# Patient Record
Sex: FEMALE | Race: OTHER | NOT HISPANIC OR LATINO
[De-identification: names, ages, dates, MRNs, and addresses within clinical notes are randomized per-mention and may not be internally consistent; named-entity substitution may affect disease eponyms.]

---

## 2021-06-25 PROBLEM — Z00.00 ENCOUNTER FOR PREVENTIVE HEALTH EXAMINATION: Noted: 2021-06-25

## 2021-06-25 PROBLEM — Z00.00 ENCOUNTER FOR PREVENTIVE HEALTH EXAMINATION: Status: ACTIVE | Noted: 2021-06-25

## 2021-06-29 ENCOUNTER — ASOB RESULT (OUTPATIENT)
Age: 26
End: 2021-06-29

## 2021-06-29 ENCOUNTER — APPOINTMENT (OUTPATIENT)
Dept: MATERNAL FETAL MEDICINE | Facility: CLINIC | Age: 26
End: 2021-06-29
Payer: COMMERCIAL

## 2021-06-29 DIAGNOSIS — O24.912 UNSPECIFIED DIABETES MELLITUS IN PREGNANCY, SECOND TRIMESTER: ICD-10-CM

## 2021-06-29 PROCEDURE — G0109 DIAB MANAGE TRN IND/GROUP: CPT | Mod: 95

## 2021-06-29 RX ORDER — CALCIUM CARB/VITAMIN D3/VIT K1 500-100-40
31G X 5/16" TABLET,CHEWABLE ORAL
Qty: 1 | Refills: 1 | Status: ACTIVE | COMMUNITY
Start: 2021-06-29 | End: 1900-01-01

## 2021-06-29 RX ORDER — ELECTROLYTES/DEXTROSE
32G X 4 MM SOLUTION, ORAL ORAL
Qty: 1 | Refills: 1 | Status: ACTIVE | COMMUNITY
Start: 2021-06-29 | End: 1900-01-01

## 2021-06-29 RX ORDER — ISOPROPYL ALCOHOL 0.7 ML/ML
SWAB TOPICAL
Qty: 2 | Refills: 2 | Status: ACTIVE | COMMUNITY
Start: 2021-06-29 | End: 1900-01-01

## 2021-06-29 RX ORDER — BLOOD-GLUCOSE METER
KIT MISCELLANEOUS 4 TIMES DAILY
Qty: 150 | Refills: 2 | Status: ACTIVE | COMMUNITY
Start: 2021-06-29 | End: 1900-01-01

## 2021-06-29 RX ORDER — LANCETS 33 GAUGE
EACH MISCELLANEOUS
Qty: 2 | Refills: 2 | Status: ACTIVE | COMMUNITY
Start: 2021-06-29 | End: 1900-01-01

## 2021-06-29 RX ORDER — BLOOD-GLUCOSE METER
W/DEVICE KIT MISCELLANEOUS
Qty: 1 | Refills: 0 | Status: ACTIVE | COMMUNITY
Start: 2021-06-29 | End: 1900-01-01

## 2021-07-06 ENCOUNTER — APPOINTMENT (OUTPATIENT)
Dept: PEDIATRIC CARDIOLOGY | Facility: CLINIC | Age: 26
End: 2021-07-06
Payer: COMMERCIAL

## 2021-07-06 PROCEDURE — 76825 ECHO EXAM OF FETAL HEART: CPT

## 2021-07-06 PROCEDURE — 99202 OFFICE O/P NEW SF 15 MIN: CPT | Mod: 25

## 2021-07-06 PROCEDURE — 93325 DOPPLER ECHO COLOR FLOW MAPG: CPT | Mod: 59

## 2021-07-06 PROCEDURE — 99072 ADDL SUPL MATRL&STAF TM PHE: CPT

## 2021-07-06 PROCEDURE — 76820 UMBILICAL ARTERY ECHO: CPT

## 2021-07-06 PROCEDURE — 76827 ECHO EXAM OF FETAL HEART: CPT

## 2021-07-12 ENCOUNTER — ASOB RESULT (OUTPATIENT)
Age: 26
End: 2021-07-12

## 2021-07-12 ENCOUNTER — APPOINTMENT (OUTPATIENT)
Dept: MATERNAL FETAL MEDICINE | Facility: CLINIC | Age: 26
End: 2021-07-12
Payer: COMMERCIAL

## 2021-07-12 PROCEDURE — G0108 DIAB MANAGE TRN  PER INDIV: CPT | Mod: 95

## 2021-07-26 ENCOUNTER — NON-APPOINTMENT (OUTPATIENT)
Age: 26
End: 2021-07-26

## 2021-07-26 ENCOUNTER — APPOINTMENT (OUTPATIENT)
Dept: MATERNAL FETAL MEDICINE | Facility: CLINIC | Age: 26
End: 2021-07-26

## 2021-07-26 ENCOUNTER — APPOINTMENT (OUTPATIENT)
Dept: ANTEPARTUM | Facility: CLINIC | Age: 26
End: 2021-07-26

## 2021-07-30 ENCOUNTER — APPOINTMENT (OUTPATIENT)
Dept: MATERNAL FETAL MEDICINE | Facility: CLINIC | Age: 26
End: 2021-07-30

## 2021-07-30 ENCOUNTER — NON-APPOINTMENT (OUTPATIENT)
Age: 26
End: 2021-07-30

## 2021-08-06 ENCOUNTER — ASOB RESULT (OUTPATIENT)
Age: 26
End: 2021-08-06

## 2021-08-06 ENCOUNTER — APPOINTMENT (OUTPATIENT)
Dept: MATERNAL FETAL MEDICINE | Facility: CLINIC | Age: 26
End: 2021-08-06
Payer: COMMERCIAL

## 2021-08-06 PROCEDURE — G0108 DIAB MANAGE TRN  PER INDIV: CPT | Mod: 95

## 2021-08-06 RX ORDER — METFORMIN ER 500 MG 500 MG/1
500 TABLET ORAL
Qty: 1 | Refills: 3 | Status: ACTIVE | COMMUNITY
Start: 2021-08-06 | End: 1900-01-01

## 2021-08-13 ENCOUNTER — APPOINTMENT (OUTPATIENT)
Dept: ANTEPARTUM | Facility: CLINIC | Age: 26
End: 2021-08-13
Payer: COMMERCIAL

## 2021-08-13 ENCOUNTER — ASOB RESULT (OUTPATIENT)
Age: 26
End: 2021-08-13

## 2021-08-13 PROCEDURE — 76816 OB US FOLLOW-UP PER FETUS: CPT

## 2021-08-13 PROCEDURE — 76818 FETAL BIOPHYS PROFILE W/NST: CPT

## 2021-08-13 PROCEDURE — 99072 ADDL SUPL MATRL&STAF TM PHE: CPT

## 2021-08-20 ENCOUNTER — ASOB RESULT (OUTPATIENT)
Age: 26
End: 2021-08-20

## 2021-08-20 ENCOUNTER — APPOINTMENT (OUTPATIENT)
Dept: ANTEPARTUM | Facility: CLINIC | Age: 26
End: 2021-08-20
Payer: COMMERCIAL

## 2021-08-20 ENCOUNTER — APPOINTMENT (OUTPATIENT)
Dept: MATERNAL FETAL MEDICINE | Facility: CLINIC | Age: 26
End: 2021-08-20
Payer: COMMERCIAL

## 2021-08-20 ENCOUNTER — APPOINTMENT (OUTPATIENT)
Dept: ANTEPARTUM | Facility: CLINIC | Age: 26
End: 2021-08-20

## 2021-08-20 PROCEDURE — 76818 FETAL BIOPHYS PROFILE W/NST: CPT

## 2021-08-20 PROCEDURE — 99072 ADDL SUPL MATRL&STAF TM PHE: CPT

## 2021-08-20 PROCEDURE — G0108 DIAB MANAGE TRN  PER INDIV: CPT

## 2021-08-24 RX ORDER — INSULIN HUMAN 100 [IU]/ML
100 INJECTION, SUSPENSION SUBCUTANEOUS
Qty: 1 | Refills: 1 | Status: ACTIVE | COMMUNITY
Start: 2021-06-29 | End: 1900-01-01

## 2021-08-27 ENCOUNTER — ASOB RESULT (OUTPATIENT)
Age: 26
End: 2021-08-27

## 2021-08-27 ENCOUNTER — APPOINTMENT (OUTPATIENT)
Dept: ANTEPARTUM | Facility: CLINIC | Age: 26
End: 2021-08-27
Payer: COMMERCIAL

## 2021-08-27 ENCOUNTER — APPOINTMENT (OUTPATIENT)
Dept: MATERNAL FETAL MEDICINE | Facility: CLINIC | Age: 26
End: 2021-08-27
Payer: COMMERCIAL

## 2021-08-27 PROCEDURE — 99072 ADDL SUPL MATRL&STAF TM PHE: CPT

## 2021-08-27 PROCEDURE — 76818 FETAL BIOPHYS PROFILE W/NST: CPT

## 2021-08-27 PROCEDURE — G0108 DIAB MANAGE TRN  PER INDIV: CPT

## 2021-09-03 ENCOUNTER — APPOINTMENT (OUTPATIENT)
Dept: MATERNAL FETAL MEDICINE | Facility: CLINIC | Age: 26
End: 2021-09-03

## 2021-09-03 ENCOUNTER — APPOINTMENT (OUTPATIENT)
Dept: ANTEPARTUM | Facility: CLINIC | Age: 26
End: 2021-09-03

## 2021-09-03 ENCOUNTER — INPATIENT (INPATIENT)
Facility: HOSPITAL | Age: 26
LOS: 3 days | Discharge: HOME | End: 2021-09-07
Attending: OBSTETRICS & GYNECOLOGY | Admitting: OBSTETRICS & GYNECOLOGY
Payer: COMMERCIAL

## 2021-09-03 ENCOUNTER — ASOB RESULT (OUTPATIENT)
Age: 26
End: 2021-09-03

## 2021-09-03 ENCOUNTER — APPOINTMENT (OUTPATIENT)
Dept: ANTEPARTUM | Facility: CLINIC | Age: 26
End: 2021-09-03
Payer: COMMERCIAL

## 2021-09-03 ENCOUNTER — TRANSCRIPTION ENCOUNTER (OUTPATIENT)
Age: 26
End: 2021-09-03

## 2021-09-03 VITALS — DIASTOLIC BLOOD PRESSURE: 85 MMHG | OXYGEN SATURATION: 99 % | SYSTOLIC BLOOD PRESSURE: 125 MMHG | HEART RATE: 101 BPM

## 2021-09-03 DIAGNOSIS — Z3A.00 WEEKS OF GESTATION OF PREGNANCY NOT SPECIFIED: ICD-10-CM

## 2021-09-03 DIAGNOSIS — O26.899 OTHER SPECIFIED PREGNANCY RELATED CONDITIONS, UNSPECIFIED TRIMESTER: ICD-10-CM

## 2021-09-03 DIAGNOSIS — Z34.80 ENCOUNTER FOR SUPERVISION OF OTHER NORMAL PREGNANCY, UNSPECIFIED TRIMESTER: ICD-10-CM

## 2021-09-03 LAB
BASOPHILS # BLD AUTO: 0.02 K/UL — SIGNIFICANT CHANGE UP (ref 0–0.2)
BASOPHILS NFR BLD AUTO: 0.2 % — SIGNIFICANT CHANGE UP (ref 0–2)
BLD GP AB SCN SERPL QL: NEGATIVE — SIGNIFICANT CHANGE UP
COVID-19 SPIKE DOMAIN AB INTERP: POSITIVE
COVID-19 SPIKE DOMAIN ANTIBODY RESULT: >250 U/ML — HIGH
EOSINOPHIL # BLD AUTO: 0.24 K/UL — SIGNIFICANT CHANGE UP (ref 0–0.5)
EOSINOPHIL NFR BLD AUTO: 2.3 % — SIGNIFICANT CHANGE UP (ref 0–6)
GLUCOSE BLDC GLUCOMTR-MCNC: 105 MG/DL — HIGH (ref 70–99)
GLUCOSE BLDC GLUCOMTR-MCNC: 85 MG/DL — SIGNIFICANT CHANGE UP (ref 70–99)
GLUCOSE BLDC GLUCOMTR-MCNC: 98 MG/DL — SIGNIFICANT CHANGE UP (ref 70–99)
HCT VFR BLD CALC: 43 % — SIGNIFICANT CHANGE UP (ref 34.5–45)
HGB BLD-MCNC: 13.9 G/DL — SIGNIFICANT CHANGE UP (ref 11.5–15.5)
IMM GRANULOCYTES NFR BLD AUTO: 0.9 % — SIGNIFICANT CHANGE UP (ref 0–1.5)
LYMPHOCYTES # BLD AUTO: 2.08 K/UL — SIGNIFICANT CHANGE UP (ref 1–3.3)
LYMPHOCYTES # BLD AUTO: 20.1 % — SIGNIFICANT CHANGE UP (ref 13–44)
MCHC RBC-ENTMCNC: 28.4 PG — SIGNIFICANT CHANGE UP (ref 27–34)
MCHC RBC-ENTMCNC: 32.3 GM/DL — SIGNIFICANT CHANGE UP (ref 32–36)
MCV RBC AUTO: 87.8 FL — SIGNIFICANT CHANGE UP (ref 80–100)
MONOCYTES # BLD AUTO: 0.67 K/UL — SIGNIFICANT CHANGE UP (ref 0–0.9)
MONOCYTES NFR BLD AUTO: 6.5 % — SIGNIFICANT CHANGE UP (ref 2–14)
NEUTROPHILS # BLD AUTO: 7.24 K/UL — SIGNIFICANT CHANGE UP (ref 1.8–7.4)
NEUTROPHILS NFR BLD AUTO: 70 % — SIGNIFICANT CHANGE UP (ref 43–77)
NRBC # BLD: 0 /100 WBCS — SIGNIFICANT CHANGE UP (ref 0–0)
PLATELET # BLD AUTO: 204 K/UL — SIGNIFICANT CHANGE UP (ref 150–400)
RBC # BLD: 4.9 M/UL — SIGNIFICANT CHANGE UP (ref 3.8–5.2)
RBC # FLD: 15.1 % — HIGH (ref 10.3–14.5)
RH IG SCN BLD-IMP: POSITIVE — SIGNIFICANT CHANGE UP
SARS-COV-2 IGG+IGM SERPL QL IA: >250 U/ML — HIGH
SARS-COV-2 IGG+IGM SERPL QL IA: POSITIVE
SARS-COV-2 RNA SPEC QL NAA+PROBE: SIGNIFICANT CHANGE UP
T PALLIDUM AB TITR SER: NEGATIVE — SIGNIFICANT CHANGE UP
WBC # BLD: 10.34 K/UL — SIGNIFICANT CHANGE UP (ref 3.8–10.5)
WBC # FLD AUTO: 10.34 K/UL — SIGNIFICANT CHANGE UP (ref 3.8–10.5)

## 2021-09-03 PROCEDURE — 76819 FETAL BIOPHYS PROFIL W/O NST: CPT

## 2021-09-03 PROCEDURE — 99072 ADDL SUPL MATRL&STAF TM PHE: CPT

## 2021-09-03 PROCEDURE — 76816 OB US FOLLOW-UP PER FETUS: CPT

## 2021-09-03 RX ORDER — SODIUM CHLORIDE 9 MG/ML
1000 INJECTION, SOLUTION INTRAVENOUS
Refills: 0 | Status: DISCONTINUED | OUTPATIENT
Start: 2021-09-03 | End: 2021-09-04

## 2021-09-03 RX ORDER — SODIUM CHLORIDE 9 MG/ML
1000 INJECTION INTRAMUSCULAR; INTRAVENOUS; SUBCUTANEOUS
Refills: 0 | Status: DISCONTINUED | OUTPATIENT
Start: 2021-09-03 | End: 2021-09-04

## 2021-09-03 RX ORDER — OXYTOCIN 10 UNIT/ML
333.33 VIAL (ML) INJECTION
Qty: 20 | Refills: 0 | Status: DISCONTINUED | OUTPATIENT
Start: 2021-09-03 | End: 2021-09-07

## 2021-09-03 RX ORDER — AMPICILLIN TRIHYDRATE 250 MG
2 CAPSULE ORAL ONCE
Refills: 0 | Status: COMPLETED | OUTPATIENT
Start: 2021-09-03 | End: 2021-09-03

## 2021-09-03 RX ORDER — CITRIC ACID/SODIUM CITRATE 300-500 MG
15 SOLUTION, ORAL ORAL EVERY 6 HOURS
Refills: 0 | Status: DISCONTINUED | OUTPATIENT
Start: 2021-09-03 | End: 2021-09-04

## 2021-09-03 RX ORDER — AMPICILLIN TRIHYDRATE 250 MG
1 CAPSULE ORAL EVERY 4 HOURS
Refills: 0 | Status: DISCONTINUED | OUTPATIENT
Start: 2021-09-03 | End: 2021-09-04

## 2021-09-03 RX ADMIN — SODIUM CHLORIDE 125 MILLILITER(S): 9 INJECTION INTRAMUSCULAR; INTRAVENOUS; SUBCUTANEOUS at 12:22

## 2021-09-03 RX ADMIN — SODIUM CHLORIDE 125 MILLILITER(S): 9 INJECTION, SOLUTION INTRAVENOUS at 12:22

## 2021-09-03 RX ADMIN — Medication 216 GRAM(S): at 12:21

## 2021-09-03 RX ADMIN — Medication 108 GRAM(S): at 20:41

## 2021-09-03 RX ADMIN — Medication 108 GRAM(S): at 16:25

## 2021-09-03 NOTE — OB RN TRIAGE NOTE - NSMATERNALFETALCONCERNS_OBGYN_ALL_OB_FT
Fetal Alert  Trivial to mild tricuspid valve regurgitation - likely a normal variant. For completeness, I would recommend a non-urgent outpatient  cardiology follow-up recommended within 2 weeks after delivery, or sooner, if there are any clinical cardiac concerns.

## 2021-09-03 NOTE — OB PROVIDER H&P - NSHPREVIEWOFSYSTEMS_GEN_ALL_CORE
ICU Vital Signs Last 24 Hrs  T(C): 37.1 (03 Sep 2021 10:49), Max: 37.1 (03 Sep 2021 10:49)  T(F): 98.8 (03 Sep 2021 10:49), Max: 98.8 (03 Sep 2021 10:49)  HR: 104 (03 Sep 2021 11:10) (93 - 104)  BP: 125/85 (03 Sep 2021 10:49) (125/85 - 125/85)  BP(mean): --  ABP: --  ABP(mean): --  RR: 17 (03 Sep 2021 10:49) (17 - 17)  SpO2: 99% (03 Sep 2021 11:10) (98% - 99%)    gen: A&Ox3  CV: rrr s1s2  abd: gravid soft  VE: 1/70/-3 ruptured grossly  SSE: ferning positive, nitrazine positive, pooling position  EFM: 135 moderate variability, + acels, -decels, reactive tracing  toco: none  bedside sonogram: cephalic presentation

## 2021-09-03 NOTE — OB PROVIDER H&P - NSMATERNALFETALCONCERNS_OBGYN_ALL_OB_FT
Yes
Fetal Alert  Trivial to mild tricuspid valve regurgitation - likely a normal variant. For completeness, I would recommend a non-urgent outpatient  cardiology follow-up recommended within 2 weeks after delivery, or sooner, if there are any clinical cardiac concerns.

## 2021-09-03 NOTE — OB PROVIDER H&P - ASSESSMENT
25 y/o  EDC 2021 @ 36.2 weeks admitted with PPROM @ 6a, clear fluid, c/w GDMA2 possibly type 2 DM.  -admit to L&D  -routine labs  -NPO bicitra  -EFM/toco  -IV hydration  -Ampicillin for unknown second swab of GBS  -PO cytotec  -POCT Q4hrs in latent labor, Q2hrs in active labor  -anesthesia consult  -anticipated     d/w Dr. Jeromy Ferreira NP

## 2021-09-03 NOTE — OB PROVIDER H&P - HISTORY OF PRESENT ILLNESS
The patient is a 25 y/o  EDC 2021 @ 36.2 weeks presenting to l&D with leakage of fluids @ 6a clear fluid. The patient reports minimal bright red vaginal bleeding. Denies contx. endorses good fetal movement. The patient accepts all blood products    allergies: nkda  meds: NPH 28 units @ HS, Metformin 500mg BID, PNV  PNC: c/w A2 diagnosed at 20 weeks, likely Type 2DM HgbA1C: 6.5% @ 20 weeks  GBS: (2021): negative, however will treat for GBS with unavailability of second swab    Medhx; pt denies cardiac, pulm, heme, GI, neuro  OBhx: current  Sxhx: pt denies  Gynhx: pt denies cysts, fibroids, abn. pap, STDs  Sochx: pt denies  Famhx: pt denies

## 2021-09-04 LAB
ALBUMIN SERPL ELPH-MCNC: 3 G/DL — LOW (ref 3.3–5)
ALP SERPL-CCNC: 168 U/L — HIGH (ref 40–120)
ALT FLD-CCNC: 16 U/L — SIGNIFICANT CHANGE UP (ref 10–45)
ANION GAP SERPL CALC-SCNC: 14 MMOL/L — SIGNIFICANT CHANGE UP (ref 5–17)
APPEARANCE UR: CLEAR — SIGNIFICANT CHANGE UP
APTT BLD: 29.6 SEC — SIGNIFICANT CHANGE UP (ref 27.5–35.5)
AST SERPL-CCNC: 19 U/L — SIGNIFICANT CHANGE UP (ref 10–40)
BACTERIA # UR AUTO: NEGATIVE — SIGNIFICANT CHANGE UP
BILIRUB SERPL-MCNC: 0.4 MG/DL — SIGNIFICANT CHANGE UP (ref 0.2–1.2)
BILIRUB UR-MCNC: NEGATIVE — SIGNIFICANT CHANGE UP
BUN SERPL-MCNC: 7 MG/DL — SIGNIFICANT CHANGE UP (ref 7–23)
CALCIUM SERPL-MCNC: 9.1 MG/DL — SIGNIFICANT CHANGE UP (ref 8.4–10.5)
CHLORIDE SERPL-SCNC: 107 MMOL/L — SIGNIFICANT CHANGE UP (ref 96–108)
CO2 SERPL-SCNC: 16 MMOL/L — LOW (ref 22–31)
COLOR SPEC: SIGNIFICANT CHANGE UP
COMMENT - URINE: SIGNIFICANT CHANGE UP
CREAT ?TM UR-MCNC: 37 MG/DL — SIGNIFICANT CHANGE UP
CREAT SERPL-MCNC: 0.54 MG/DL — SIGNIFICANT CHANGE UP (ref 0.5–1.3)
DIFF PNL FLD: ABNORMAL
EPI CELLS # UR: 1 /HPF — SIGNIFICANT CHANGE UP
FIBRINOGEN PPP-MCNC: 675 MG/DL — HIGH (ref 290–520)
GLUCOSE BLDC GLUCOMTR-MCNC: 100 MG/DL — HIGH (ref 70–99)
GLUCOSE BLDC GLUCOMTR-MCNC: 107 MG/DL — HIGH (ref 70–99)
GLUCOSE SERPL-MCNC: 100 MG/DL — HIGH (ref 70–99)
GLUCOSE UR QL: NEGATIVE — SIGNIFICANT CHANGE UP
HYALINE CASTS # UR AUTO: 1 /LPF — SIGNIFICANT CHANGE UP (ref 0–2)
INR BLD: 0.96 RATIO — SIGNIFICANT CHANGE UP (ref 0.88–1.16)
KETONES UR-MCNC: ABNORMAL
LDH SERPL L TO P-CCNC: 159 U/L — SIGNIFICANT CHANGE UP (ref 50–242)
LEUKOCYTE ESTERASE UR-ACNC: NEGATIVE — SIGNIFICANT CHANGE UP
NITRITE UR-MCNC: NEGATIVE — SIGNIFICANT CHANGE UP
PH UR: 6.5 — SIGNIFICANT CHANGE UP (ref 5–8)
POTASSIUM SERPL-MCNC: 4 MMOL/L — SIGNIFICANT CHANGE UP (ref 3.5–5.3)
POTASSIUM SERPL-SCNC: 4 MMOL/L — SIGNIFICANT CHANGE UP (ref 3.5–5.3)
PROT ?TM UR-MCNC: 10 MG/DL — SIGNIFICANT CHANGE UP (ref 0–12)
PROT ?TM UR-MCNC: 11 MG/DL — SIGNIFICANT CHANGE UP (ref 0–12)
PROT SERPL-MCNC: 5.8 G/DL — LOW (ref 6–8.3)
PROT UR-MCNC: NEGATIVE — SIGNIFICANT CHANGE UP
PROT/CREAT UR-RTO: 0.3 RATIO — HIGH (ref 0–0.2)
PROTHROM AB SERPL-ACNC: 11.5 SEC — SIGNIFICANT CHANGE UP (ref 10.6–13.6)
RBC CASTS # UR COMP ASSIST: 1 /HPF — SIGNIFICANT CHANGE UP (ref 0–4)
SODIUM SERPL-SCNC: 137 MMOL/L — SIGNIFICANT CHANGE UP (ref 135–145)
SP GR SPEC: 1.01 — LOW (ref 1.01–1.02)
URATE SERPL-MCNC: 7.4 MG/DL — HIGH (ref 2.5–7)
UROBILINOGEN FLD QL: NEGATIVE — SIGNIFICANT CHANGE UP
WBC UR QL: 1 /HPF — SIGNIFICANT CHANGE UP (ref 0–5)

## 2021-09-04 RX ORDER — KETOROLAC TROMETHAMINE 30 MG/ML
30 SYRINGE (ML) INJECTION EVERY 6 HOURS
Refills: 0 | Status: DISCONTINUED | OUTPATIENT
Start: 2021-09-04 | End: 2021-09-05

## 2021-09-04 RX ORDER — HEPARIN SODIUM 5000 [USP'U]/ML
5000 INJECTION INTRAVENOUS; SUBCUTANEOUS EVERY 12 HOURS
Refills: 0 | Status: DISCONTINUED | OUTPATIENT
Start: 2021-09-04 | End: 2021-09-07

## 2021-09-04 RX ORDER — ACETAMINOPHEN 500 MG
975 TABLET ORAL
Refills: 0 | Status: DISCONTINUED | OUTPATIENT
Start: 2021-09-04 | End: 2021-09-07

## 2021-09-04 RX ORDER — SIMETHICONE 80 MG/1
80 TABLET, CHEWABLE ORAL EVERY 4 HOURS
Refills: 0 | Status: DISCONTINUED | OUTPATIENT
Start: 2021-09-04 | End: 2021-09-07

## 2021-09-04 RX ORDER — NALBUPHINE HYDROCHLORIDE 10 MG/ML
2.5 INJECTION, SOLUTION INTRAMUSCULAR; INTRAVENOUS; SUBCUTANEOUS EVERY 6 HOURS
Refills: 0 | Status: DISCONTINUED | OUTPATIENT
Start: 2021-09-04 | End: 2021-09-05

## 2021-09-04 RX ORDER — LANOLIN
1 OINTMENT (GRAM) TOPICAL EVERY 6 HOURS
Refills: 0 | Status: DISCONTINUED | OUTPATIENT
Start: 2021-09-04 | End: 2021-09-07

## 2021-09-04 RX ORDER — TETANUS TOXOID, REDUCED DIPHTHERIA TOXOID AND ACELLULAR PERTUSSIS VACCINE, ADSORBED 5; 2.5; 8; 8; 2.5 [IU]/.5ML; [IU]/.5ML; UG/.5ML; UG/.5ML; UG/.5ML
0.5 SUSPENSION INTRAMUSCULAR ONCE
Refills: 0 | Status: DISCONTINUED | OUTPATIENT
Start: 2021-09-04 | End: 2021-09-07

## 2021-09-04 RX ORDER — OXYTOCIN 10 UNIT/ML
333.33 VIAL (ML) INJECTION
Qty: 20 | Refills: 0 | Status: DISCONTINUED | OUTPATIENT
Start: 2021-09-04 | End: 2021-09-07

## 2021-09-04 RX ORDER — INFLUENZA VIRUS VACCINE 15; 15; 15; 15 UG/.5ML; UG/.5ML; UG/.5ML; UG/.5ML
0.5 SUSPENSION INTRAMUSCULAR ONCE
Refills: 0 | Status: DISCONTINUED | OUTPATIENT
Start: 2021-09-04 | End: 2021-09-07

## 2021-09-04 RX ORDER — IBUPROFEN 200 MG
600 TABLET ORAL EVERY 6 HOURS
Refills: 0 | Status: COMPLETED | OUTPATIENT
Start: 2021-09-04 | End: 2022-08-03

## 2021-09-04 RX ORDER — NALOXONE HYDROCHLORIDE 4 MG/.1ML
0.1 SPRAY NASAL
Refills: 0 | Status: DISCONTINUED | OUTPATIENT
Start: 2021-09-04 | End: 2021-09-05

## 2021-09-04 RX ORDER — MAGNESIUM HYDROXIDE 400 MG/1
30 TABLET, CHEWABLE ORAL
Refills: 0 | Status: DISCONTINUED | OUTPATIENT
Start: 2021-09-04 | End: 2021-09-07

## 2021-09-04 RX ORDER — CEFAZOLIN SODIUM 1 G
VIAL (EA) INJECTION
Refills: 0 | Status: DISCONTINUED | OUTPATIENT
Start: 2021-09-06 | End: 2021-09-07

## 2021-09-04 RX ORDER — MORPHINE SULFATE 50 MG/1
2 CAPSULE, EXTENDED RELEASE ORAL ONCE
Refills: 0 | Status: DISCONTINUED | OUTPATIENT
Start: 2021-09-04 | End: 2021-09-05

## 2021-09-04 RX ORDER — OXYCODONE HYDROCHLORIDE 5 MG/1
5 TABLET ORAL
Refills: 0 | Status: DISCONTINUED | OUTPATIENT
Start: 2021-09-04 | End: 2021-09-07

## 2021-09-04 RX ORDER — CEFAZOLIN SODIUM 1 G
2000 VIAL (EA) INJECTION ONCE
Refills: 0 | Status: DISCONTINUED | OUTPATIENT
Start: 2021-09-04 | End: 2021-09-07

## 2021-09-04 RX ORDER — OXYCODONE HYDROCHLORIDE 5 MG/1
5 TABLET ORAL ONCE
Refills: 0 | Status: DISCONTINUED | OUTPATIENT
Start: 2021-09-04 | End: 2021-09-07

## 2021-09-04 RX ORDER — SODIUM CHLORIDE 9 MG/ML
1000 INJECTION, SOLUTION INTRAVENOUS
Refills: 0 | Status: DISCONTINUED | OUTPATIENT
Start: 2021-09-04 | End: 2021-09-07

## 2021-09-04 RX ORDER — ONDANSETRON 8 MG/1
4 TABLET, FILM COATED ORAL EVERY 6 HOURS
Refills: 0 | Status: DISCONTINUED | OUTPATIENT
Start: 2021-09-04 | End: 2021-09-05

## 2021-09-04 RX ORDER — DIPHENHYDRAMINE HCL 50 MG
25 CAPSULE ORAL EVERY 6 HOURS
Refills: 0 | Status: DISCONTINUED | OUTPATIENT
Start: 2021-09-04 | End: 2021-09-07

## 2021-09-04 RX ORDER — SODIUM CHLORIDE 9 MG/ML
500 INJECTION, SOLUTION INTRAVENOUS ONCE
Refills: 0 | Status: DISCONTINUED | OUTPATIENT
Start: 2021-09-04 | End: 2021-09-07

## 2021-09-04 RX ORDER — DEXAMETHASONE 0.5 MG/5ML
4 ELIXIR ORAL EVERY 6 HOURS
Refills: 0 | Status: DISCONTINUED | OUTPATIENT
Start: 2021-09-04 | End: 2021-09-05

## 2021-09-04 RX ORDER — CEFAZOLIN SODIUM 1 G
2000 VIAL (EA) INJECTION EVERY 8 HOURS
Refills: 0 | Status: COMPLETED | OUTPATIENT
Start: 2021-09-04 | End: 2021-09-06

## 2021-09-04 RX ADMIN — Medication 100 MILLIGRAM(S): at 11:46

## 2021-09-04 RX ADMIN — Medication 30 MILLIGRAM(S): at 17:19

## 2021-09-04 RX ADMIN — Medication 100 MILLIGRAM(S): at 20:22

## 2021-09-04 RX ADMIN — HEPARIN SODIUM 5000 UNIT(S): 5000 INJECTION INTRAVENOUS; SUBCUTANEOUS at 11:42

## 2021-09-04 RX ADMIN — SIMETHICONE 80 MILLIGRAM(S): 80 TABLET, CHEWABLE ORAL at 20:26

## 2021-09-04 RX ADMIN — Medication 30 MILLIGRAM(S): at 17:34

## 2021-09-04 RX ADMIN — Medication 975 MILLIGRAM(S): at 21:30

## 2021-09-04 RX ADMIN — Medication 30 MILLIGRAM(S): at 11:47

## 2021-09-04 RX ADMIN — Medication 108 GRAM(S): at 00:37

## 2021-09-04 RX ADMIN — Medication 975 MILLIGRAM(S): at 20:26

## 2021-09-04 RX ADMIN — Medication 30 MILLIGRAM(S): at 17:30

## 2021-09-04 NOTE — OB PROVIDER LABOR PROGRESS NOTE - ASSESSMENT
the patient is at 9.5 cm and the vtx is at -1. the patient is requesting a  section. in view of the fact that a lot of pushig would be required and that the pt refuses to push, i feel that doing a  section is the only recourse.

## 2021-09-04 NOTE — OB PROVIDER DELIVERY SUMMARY - NSPROVIDERDELIVERYNOTE_OBGYN_ALL_OB_FT
viable male infant apgar 8/9 weight 7-9 at 36 weeks 3 days gestation. uterus closed in two layers. OP. loose nuchal cord. uterus closed in two layers. ebl 900 cc.

## 2021-09-04 NOTE — OB NEONATOLOGY/PEDIATRICIAN DELIVERY SUMMARY - NSPEDSNEONOTESA_OBGYN_ALL_OB_FT
Baby boy born @ 36.3 weeks via CS to a 25 y/o A+  mother.  Maternal hx IVF, GDM on insulin and metformin. Prenatal hx of trivial to mild tricuspid valve regurgitation.  Prenatal labs NR/immune/neg.  GBS neg on , amp x6.  COVID neg. SROM at 5:40 am on 9/3, clear fluids.  Baby emerged vigorous with good cry.  W/d/s/s with APGARs of 8/9.   Mom plans to breastfeed and consents hepB. Circ declined. EOS 0.28. Baby boy born @ 36.3 weeks via CS for failure to dilate to a 27 y/o A+  mother.  Maternal hx IVF, GDM on insulin and metformin. Prenatal hx of trivial to mild tricuspid valve regurgitation.  Prenatal labs NR/immune/neg.  GBS neg on , amp x6.  COVID neg. SROM at 5:40 am on 9/3, clear fluids.  Nuchal x1. Baby emerged vigorous with good cry.  W/d/s/s with APGARs of 8/9.   Mom plans to breastfeed and consents hepB. Circ declined. EOS 0.28.

## 2021-09-04 NOTE — OB RN INTRAOPERATIVE NOTE - NSSELHIDDEN_OBGYN_ALL_OB_FT
[[email or web address]_ALL_OB_FT:[email or web address]=],[NS_DeliveryRN_OBGYN_ALL_OB_FT:[email or web address]=]

## 2021-09-04 NOTE — OB RN DELIVERY SUMMARY - BABY A: APGAR 1 MIN COLOR, DELIVERY
Bloodwork done-cmp-lipid  Message left on patients voice mail to return call regarding results.   (0) blue, pale

## 2021-09-05 ENCOUNTER — TRANSCRIPTION ENCOUNTER (OUTPATIENT)
Age: 26
End: 2021-09-05

## 2021-09-05 DIAGNOSIS — E11.9 TYPE 2 DIABETES MELLITUS WITHOUT COMPLICATIONS: ICD-10-CM

## 2021-09-05 DIAGNOSIS — E66.3 OVERWEIGHT: ICD-10-CM

## 2021-09-05 LAB
GLUCOSE BLDC GLUCOMTR-MCNC: 195 MG/DL — HIGH (ref 70–99)
GLUCOSE BLDC GLUCOMTR-MCNC: 99 MG/DL — SIGNIFICANT CHANGE UP (ref 70–99)
HCT VFR BLD CALC: 29.6 % — LOW (ref 34.5–45)
HGB BLD-MCNC: 9.7 G/DL — LOW (ref 11.5–15.5)
MCHC RBC-ENTMCNC: 28.3 PG — SIGNIFICANT CHANGE UP (ref 27–34)
MCHC RBC-ENTMCNC: 32.8 GM/DL — SIGNIFICANT CHANGE UP (ref 32–36)
MCV RBC AUTO: 86.3 FL — SIGNIFICANT CHANGE UP (ref 80–100)
NRBC # BLD: 0 /100 WBCS — SIGNIFICANT CHANGE UP (ref 0–0)
PLATELET # BLD AUTO: 134 K/UL — LOW (ref 150–400)
RBC # BLD: 3.43 M/UL — LOW (ref 3.8–5.2)
RBC # FLD: 15.2 % — HIGH (ref 10.3–14.5)
WBC # BLD: 7.8 K/UL — SIGNIFICANT CHANGE UP (ref 3.8–10.5)
WBC # FLD AUTO: 7.8 K/UL — SIGNIFICANT CHANGE UP (ref 3.8–10.5)

## 2021-09-05 PROCEDURE — 99222 1ST HOSP IP/OBS MODERATE 55: CPT

## 2021-09-05 RX ORDER — POLYETHYLENE GLYCOL 3350 17 G/17G
17 POWDER, FOR SOLUTION ORAL AT BEDTIME
Refills: 0 | Status: DISCONTINUED | OUTPATIENT
Start: 2021-09-05 | End: 2021-09-07

## 2021-09-05 RX ORDER — IBUPROFEN 200 MG
600 TABLET ORAL EVERY 6 HOURS
Refills: 0 | Status: DISCONTINUED | OUTPATIENT
Start: 2021-09-05 | End: 2021-09-07

## 2021-09-05 RX ORDER — SENNA PLUS 8.6 MG/1
2 TABLET ORAL AT BEDTIME
Refills: 0 | Status: DISCONTINUED | OUTPATIENT
Start: 2021-09-05 | End: 2021-09-07

## 2021-09-05 RX ADMIN — Medication 600 MILLIGRAM(S): at 13:40

## 2021-09-05 RX ADMIN — Medication 100 MILLIGRAM(S): at 20:37

## 2021-09-05 RX ADMIN — HEPARIN SODIUM 5000 UNIT(S): 5000 INJECTION INTRAVENOUS; SUBCUTANEOUS at 23:31

## 2021-09-05 RX ADMIN — Medication 600 MILLIGRAM(S): at 19:22

## 2021-09-05 RX ADMIN — POLYETHYLENE GLYCOL 3350 17 GRAM(S): 17 POWDER, FOR SOLUTION ORAL at 20:36

## 2021-09-05 RX ADMIN — Medication 30 MILLIGRAM(S): at 00:45

## 2021-09-05 RX ADMIN — HEPARIN SODIUM 5000 UNIT(S): 5000 INJECTION INTRAVENOUS; SUBCUTANEOUS at 13:05

## 2021-09-05 RX ADMIN — Medication 975 MILLIGRAM(S): at 15:25

## 2021-09-05 RX ADMIN — Medication 975 MILLIGRAM(S): at 01:00

## 2021-09-05 RX ADMIN — Medication 600 MILLIGRAM(S): at 07:15

## 2021-09-05 RX ADMIN — Medication 100 MILLIGRAM(S): at 12:47

## 2021-09-05 RX ADMIN — Medication 975 MILLIGRAM(S): at 09:28

## 2021-09-05 RX ADMIN — Medication 975 MILLIGRAM(S): at 04:35

## 2021-09-05 RX ADMIN — HEPARIN SODIUM 5000 UNIT(S): 5000 INJECTION INTRAVENOUS; SUBCUTANEOUS at 00:10

## 2021-09-05 RX ADMIN — SENNA PLUS 2 TABLET(S): 8.6 TABLET ORAL at 20:36

## 2021-09-05 RX ADMIN — Medication 600 MILLIGRAM(S): at 18:48

## 2021-09-05 RX ADMIN — Medication 975 MILLIGRAM(S): at 16:00

## 2021-09-05 RX ADMIN — Medication 600 MILLIGRAM(S): at 12:57

## 2021-09-05 RX ADMIN — Medication 100 MILLIGRAM(S): at 04:01

## 2021-09-05 RX ADMIN — Medication 600 MILLIGRAM(S): at 06:42

## 2021-09-05 RX ADMIN — Medication 975 MILLIGRAM(S): at 03:59

## 2021-09-05 RX ADMIN — Medication 30 MILLIGRAM(S): at 00:10

## 2021-09-05 NOTE — PROGRESS NOTE ADULT - SUBJECTIVE AND OBJECTIVE BOX
Day 1 of Anesthesia Pain Management Service    SUBJECTIVE:  Pain Scale Score:          [X] Refer to charted pain scores    THERAPY:    s/p _2__mg PF morphine on _epi__      MEDICATIONS  (STANDING):  acetaminophen   Tablet .. 975 milliGRAM(s) Oral <User Schedule>  ceFAZolin   IVPB      ceFAZolin   IVPB 2000 milliGRAM(s) IV Intermittent once  ceFAZolin   IVPB 2000 milliGRAM(s) IV Intermittent every 8 hours  diphtheria/tetanus/pertussis (acellular) Vaccine (ADAcel) 0.5 milliLiter(s) IntraMuscular once  heparin   Injectable 5000 Unit(s) SubCutaneous every 12 hours  ibuprofen  Tablet. 600 milliGRAM(s) Oral every 6 hours  influenza   Vaccine 0.5 milliLiter(s) IntraMuscular once  lactated ringers Bolus 500 milliLiter(s) IV Bolus once  lactated ringers. 1000 milliLiter(s) (125 mL/Hr) IV Continuous <Continuous>  oxytocin Infusion 333.333 milliUNIT(s)/Min (1000 mL/Hr) IV Continuous <Continuous>  oxytocin Infusion 333.333 milliUNIT(s)/Min (1000 mL/Hr) IV Continuous <Continuous>    MEDICATIONS  (PRN):  diphenhydrAMINE 25 milliGRAM(s) Oral every 6 hours PRN Pruritus  lanolin Ointment 1 Application(s) Topical every 6 hours PRN Sore Nipples  magnesium hydroxide Suspension 30 milliLiter(s) Oral two times a day PRN Constipation  oxyCODONE    IR 5 milliGRAM(s) Oral every 3 hours PRN Moderate to Severe Pain (4-10)  oxyCODONE    IR 5 milliGRAM(s) Oral once PRN Moderate to Severe Pain (4-10)  simethicone 80 milliGRAM(s) Chew every 4 hours PRN Gas      OBJECTIVE:    Sedation:        	[X] Alert	[ ] Drowsy	[ ] Arousable      [ ] Asleep       [ ] Unresponsive    Side Effects:	[X] None	[ ] Nausea	[ ] Vomiting         [ ] Pruritus  		[ ] Weakness            [ ] Numbness	          [ ] Other:    Vital Signs Last 24 Hrs  T(C): 36.7 (05 Sep 2021 13:15), Max: 36.9 (04 Sep 2021 17:00)  T(F): 98 (05 Sep 2021 13:15), Max: 98.4 (04 Sep 2021 17:00)  HR: 91 (05 Sep 2021 13:15) (84 - 100)  BP: 115/83 (05 Sep 2021 13:15) (104/74 - 115/83)  BP(mean): --  RR: 18 (05 Sep 2021 13:15) (18 - 18)  SpO2: 99% (05 Sep 2021 13:15) (97% - 99%)    ASSESSMENT/ PLAN  [X] Patient transitioned to prn analgesics  [X] Pain management per primary service, pain service to sign off   [X]Documentation and Verification of current medications

## 2021-09-05 NOTE — DISCHARGE NOTE OB - PATIENT PORTAL LINK FT
You can access the FollowMyHealth Patient Portal offered by [facility] by registering at the following website: [email or web address]. By joining [employer]’s FollowMyHealth portal, you will also be able to view your health information using other applications (apps) compatible with our system.

## 2021-09-05 NOTE — CONSULT NOTE ADULT - ASSESSMENT
25 y/o female 25 y/o female with likely T2DM vs. GDM (diagnosed by A1c at 20 weeks in [place]) with no known h/o prediabetes s/p  for baby boy at 36w3d, POD#1 today. Endocrine consulted for management of diabetes mellitus, diagnosis and evaluation    Diabetes Mellitus  - likely T2DM as she was diagnosed at 20 weeks or less in [place], unclear if OGTT was performed at the time  - A1c test is unreliable during pregnancy due to limitations of its utility in the pregnant patient  - FS are low 100s at this time, suggested of impaired fasting glucose in a non-pregnant patient  - Would recommend consistent carb diet inpatient and outpatient  - Would recommend check FS TID AC and HS inpatient. If FS remain <126 fasting and in low 100 range, would recommend no medication, diet control, frequent in-home FS testing BID in a staggered manner (fasting and 1 other FS before any meal or bedtime) for goal FS<140. If FS>140, she will need Metformin at home, to call Ob/Gyn  - If FS are well controlled without metformin, would recommend OGTT in 6 weeks for accurate diagnosis  - If FS is initiated before 6 weeks, would recommend A1c testing in 3 months for accurate diagnosis  - Patient was advised to continue eating healthy meals (consistent low/moderate carb meals) and exercise   - F/u OB/GYN in 6 weeks  - For mild hyperglycemia and prediabetes, can be managed with primary care physician  - For complex hyperglycemia management, can be seen with [facility] at [address], [address], [place] [address]    Obesity  - Recommend healthy diet (low/mod consistent carb diet) and routine exercise    D/w with , RN, and OB team  Will monitor FS trend inpatient and provide final recs prior to discharge based on trend.    *Please note a different provider maybe managing this patient tomorrow/daily*. Please contact our office at [phone] regarding follow-up queries about this patient. For any endocrine emergencies, please state urgency when calling [phone] during business hours and to speak with on-call fellow after 5pm and on weekends.    Virgen Sanchez DO  Pager: 9AM - 5PM (Mon-Fri): [phone]  After 5PM and on Weekends: [phone]  27 y/o female with likely T2DM vs. GDM (diagnosed by A1c at 20 weeks in [place]) with no known h/o prediabetes s/p  for baby boy at 36w3d, POD#1 today. Endocrine consulted for management of diabetes mellitus, diagnosis and evaluation    Diabetes Mellitus  - likely T2DM as she was diagnosed at 20 weeks or less in [place], unclear if OGTT was performed at the time  - A1c test is unreliable during pregnancy due to limitations of its utility in the pregnant patient  - FS are low 100s at this time, suggested of impaired fasting glucose in a non-pregnant patient  - Would recommend consistent carb diet inpatient and outpatient  - Would recommend check FS TID AC and HS inpatient. If FS remain <126 fasting and in low 100 range, would recommend no medication, diet control, frequent in-home FS testing BID in a staggered manner (fasting and 1 other FS before any meal or bedtime) for goal FS<140. If FS>140, she will need Metformin at home, to call Ob/Gyn  - If FS are well controlled without metformin, would recommend OGTT in 6 weeks for accurate diagnosis  - If FS are high and metformin is re-initiated before 6 weeks, would recommend A1c testing in 3 months for accurate diagnosis  - Patient was advised to continue eating healthy meals (consistent low/moderate carb meals) and exercise   - F/u OB/GYN in 6 weeks  - For mild hyperglycemia and prediabetes, can be managed with primary care physician  - For complex hyperglycemia management, can be seen with [facility] at [address], [address], [place] [address]    Obesity  - Recommend healthy diet (low/mod consistent carb diet) and routine exercise    D/w with , RN, and OB team  Will monitor FS trend inpatient and provide final recs prior to discharge based on trend.    *Please note a different provider maybe managing this patient tomorrow/daily*. Please contact our office at [phone] regarding follow-up queries about this patient. For any endocrine emergencies, please state urgency when calling [phone] during business hours and to speak with on-call fellow after 5pm and on weekends.    Virgen Sanchez DO  Pager: 9AM - 5PM (Mon-Fri): [phone]  After 5PM and on Weekends: [phone]

## 2021-09-05 NOTE — CONSULT NOTE ADULT - SUBJECTIVE AND OBJECTIVE BOX
Reason For Consult: T2DM management s/p     HPI: The patient is a 25 y/o  EDC 2021 @ 36.2 weeks presenting to l&D with leakage of fluids @ 6a clear fluid. The patient reports minimal bright red vaginal bleeding. Denies contx. endorses good fetal movement. The patient accepts all blood products    allergies: nkda  meds: NPH 28 units @ HS, Metformin 500mg BID, PNV  PNC: c/w A2 diagnosed at 20 weeks, likely Type 2DM HgbA1C: 7.3% on 21 at 22.3 weeks and 6.5% on 21  GBS: (2021): negative, however will treat for GBS with unavailability of second swab    Medhx; pt denies cardiac, pulm, heme, GI, neuro  OBhx: current  Sxhx: pt denies  Gynhx: pt denies cysts, fibroids, abn. pap, STDs  Sochx: pt denies  Famhx: pt denies  (03 Sep 2021 11:36)      Endocrine History:  At 35.2 weeks on NPH 26 units QHS and Metformin  mg BID.      PAST MEDICAL & SURGICAL HISTORY:    FAMILY HISTORY:      Social History:    Outpatient Medications:    MEDICATIONS  (STANDING):  acetaminophen   Tablet .. 975 milliGRAM(s) Oral <User Schedule>  ceFAZolin   IVPB      ceFAZolin   IVPB 2000 milliGRAM(s) IV Intermittent once  ceFAZolin   IVPB 2000 milliGRAM(s) IV Intermittent every 8 hours  diphtheria/tetanus/pertussis (acellular) Vaccine (ADAcel) 0.5 milliLiter(s) IntraMuscular once  heparin   Injectable 5000 Unit(s) SubCutaneous every 12 hours  ibuprofen  Tablet. 600 milliGRAM(s) Oral every 6 hours  influenza   Vaccine 0.5 milliLiter(s) IntraMuscular once  lactated ringers Bolus 500 milliLiter(s) IV Bolus once  lactated ringers. 1000 milliLiter(s) (125 mL/Hr) IV Continuous <Continuous>  oxytocin Infusion 333.333 milliUNIT(s)/Min (1000 mL/Hr) IV Continuous <Continuous>  oxytocin Infusion 333.333 milliUNIT(s)/Min (1000 mL/Hr) IV Continuous <Continuous>    MEDICATIONS  (PRN):  diphenhydrAMINE 25 milliGRAM(s) Oral every 6 hours PRN Pruritus  lanolin Ointment 1 Application(s) Topical every 6 hours PRN Sore Nipples  magnesium hydroxide Suspension 30 milliLiter(s) Oral two times a day PRN Constipation  oxyCODONE    IR 5 milliGRAM(s) Oral every 3 hours PRN Moderate to Severe Pain (4-10)  oxyCODONE    IR 5 milliGRAM(s) Oral once PRN Moderate to Severe Pain (4-10)  simethicone 80 milliGRAM(s) Chew every 4 hours PRN Gas      Allergies  No Known Allergies      Review of Systems:  Constitutional: No fever  Eyes: No blurry vision  Neuro: No tremors  HEENT: No pain  Cardiovascular: No chest pain, palpitations  Respiratory: No SOB, no cough  GI: No nausea, vomiting, abdominal pain  : No dysuria  Skin: no rash  Psych: no depression  Endocrine: no polyuria, polydipsia  Hem/lymph: no swelling  Osteoporosis: no fractures    ALL OTHER SYSTEMS REVIEWED AND NEGATIVE  PHYSICAL EXAM:  VITALS: T(C): 36.7 (21 @ 13:15)  T(F): 98 (21 @ 13:15), Max: 98.4 (21 @ 17:00)  HR: 91 (21 @ 13:15) (84 - 100)  BP: 115/83 (21 @ 13:15) (104/74 - 115/83)  RR:  (18 - 18)  SpO2:  (97% - 99%)  Wt(kg): 98 kg    GENERAL: NAD, well-groomed, well-developed  EYES: No proptosis, no lid lag, anicteric  HEENT:  Atraumatic, Normocephalic, moist mucous membranes  THYROID: Normal size, no palpable nodules  RESPIRATORY: Clear to auscultation bilaterally; No rales, rhonchi, wheezing, or rubs  CARDIOVASCULAR: Regular rate and rhythm; No murmurs; no peripheral edema  GI: Soft, nontender, non distended, normal bowel sounds  SKIN: Dry, intact, No rashes or lesions  MUSCULOSKELETAL: Full range of motion, normal strength  NEURO: sensation intact, extraocular movements intact, no tremor, normal reflexes  PSYCH: Alert and oriented x 3, normal affect, normal mood  CUSHING'S SIGNS: no striae    POCT Blood Glucose.: 107 mg/dL (21 @ 01:54)  POCT Blood Glucose.: 100 mg/dL (21 @ 00:11)  POCT Blood Glucose.: 105 mg/dL (21 @ 19:52)  POCT Blood Glucose.: 98 mg/dL (21 @ 15:58)  POCT Blood Glucose.: 85 mg/dL (21 @ 11:50)                            9.7    7.80  )-----------( 134      ( 05 Sep 2021 06:06 )             29.6           137  |  107  |  7   ----------------------------<  100<H>  4.0   |  16<L>  |  0.54    EGFR if : 151  EGFR if non : 130    Ca    9.1          TPro  5.8<L>  /  Alb  3.0<L>  /  TBili  0.4  /  DBili  x   /  AST  19  /  ALT  16  /  AlkPhos  168<H>                       Reason For Consult: T2DM management s/p     HPI: The patient is a 27 y/o  EDC 2021 @ 36.2 weeks presenting to l&D with leakage of fluids @ 6a clear fluid. The patient reports minimal bright red vaginal bleeding. Denies contx. endorses good fetal movement. The patient accepts all blood products    allergies: nkda  meds: NPH 28 units @ HS, Metformin 500mg BID, PNV  PNC: c/w A2 diagnosed at 20 weeks, likely Type 2DM HgbA1C: 7.3% on 21 at 22.3 weeks and 6.5% on 21  GBS: (2021): negative, however will treat for GBS with unavailability of second swab    Medhx; pt denies cardiac, pulm, heme, GI, neuro  OBhx: current  Sxhx: pt denies  Gynhx: pt denies cysts, fibroids, abn. pap, STDs  Sochx: pt denies  Famhx: pt denies  (03 Sep 2021 11:36)      Endocrine History:  Spoke to patient and   Patient was gaining weight during 1st and 2nd trimester in [place] and was referred to Diabetologist at 20 weeks. She was started on Metformin  mg BID and NPH 5 units. She took this x 3 days and FS improved, so NPH was stopped and Metformin reduced to 500 mg QD. 2 weeks later, patient came to [facility] at 22 weeks and was seen by OB/DM clinic and was started on NPH insulin and Metformin  mg was increased to BID dosing.  At 35.2 weeks she was on NPH 26 units QHS and Metformin  mg BID.    No polyuria or polydipsia prior to pregnancy. No blurry vision  No neuropathy, nephropathy, CAD or CVA  Strong FH of T2DM in parents and sibling    Patient was checking FS and fasting FS<90 and 1 hour post-meal FS<140 during pregnancy  She was eating a carb consistent meal  Baby boy delivered, 7 lbs      PAST MEDICAL & SURGICAL HISTORY:  Infertility and was getting IVF prior to this pregnancy    FAMILY HISTORY:  T2DM in parents and sibling    Social History: No tobacco or alcohol    Outpatient Medications: prenatal MVI, NPH insulin, Metformin  mg BID, and Progesterone    MEDICATIONS  (STANDING):  acetaminophen   Tablet .. 975 milliGRAM(s) Oral <User Schedule>  ceFAZolin   IVPB      ceFAZolin   IVPB 2000 milliGRAM(s) IV Intermittent once  ceFAZolin   IVPB 2000 milliGRAM(s) IV Intermittent every 8 hours  diphtheria/tetanus/pertussis (acellular) Vaccine (ADAcel) 0.5 milliLiter(s) IntraMuscular once  heparin   Injectable 5000 Unit(s) SubCutaneous every 12 hours  ibuprofen  Tablet. 600 milliGRAM(s) Oral every 6 hours  influenza   Vaccine 0.5 milliLiter(s) IntraMuscular once  lactated ringers Bolus 500 milliLiter(s) IV Bolus once  lactated ringers. 1000 milliLiter(s) (125 mL/Hr) IV Continuous <Continuous>  oxytocin Infusion 333.333 milliUNIT(s)/Min (1000 mL/Hr) IV Continuous <Continuous>  oxytocin Infusion 333.333 milliUNIT(s)/Min (1000 mL/Hr) IV Continuous <Continuous>    MEDICATIONS  (PRN):  diphenhydrAMINE 25 milliGRAM(s) Oral every 6 hours PRN Pruritus  lanolin Ointment 1 Application(s) Topical every 6 hours PRN Sore Nipples  magnesium hydroxide Suspension 30 milliLiter(s) Oral two times a day PRN Constipation  oxyCODONE    IR 5 milliGRAM(s) Oral every 3 hours PRN Moderate to Severe Pain (4-10)  oxyCODONE    IR 5 milliGRAM(s) Oral once PRN Moderate to Severe Pain (4-10)  simethicone 80 milliGRAM(s) Chew every 4 hours PRN Gas      Allergies  No Known Allergies      Review of Systems:  Constitutional: No fever  Eyes: No blurry vision  Neuro: No tremors  HEENT: No pain  Cardiovascular: No chest pain, palpitations  Respiratory: No SOB, no cough  GI: No nausea, vomiting, + mild abdominal pain s/p   : No dysuria  Skin: no rash  Psych: no depression  Endocrine: no polyuria, polydipsia  Hem/lymph: mild swelling  Osteoporosis: no fractures    ALL OTHER SYSTEMS REVIEWED AND NEGATIVE  PHYSICAL EXAM:  VITALS: T(C): 36.7 (21 @ 13:15)  T(F): 98 (21 @ 13:15), Max: 98.4 (21 @ 17:00)  HR: 91 (21 @ 13:15) (84 - 100)  BP: 115/83 (21 @ 13:15) (104/74 - 115/83)  RR:  (18 - 18)  SpO2:  (97% - 99%)  Wt(kg): 98 kg    GENERAL: NAD, well-groomed, well-developed, obese  EYES: No proptosis, no lid lag, anicteric  HEENT:  Atraumatic, Normocephalic, moist mucous membranes  THYROID: Normal size, no palpable nodules  RESPIRATORY: Clear to auscultation bilaterally; No rales, rhonchi, wheezing, or rubs  CARDIOVASCULAR: Regular rate and rhythm; No murmurs; no peripheral edema  GI: Soft, nontender, non distended, normal bowel sounds  SKIN: Dry, intact, No rashes or lesions  MUSCULOSKELETAL: Full range of motion, normal strength  NEURO: sensation intact, extraocular movements intact, no tremor  PSYCH: Alert and oriented x 3, normal affect, normal mood  CUSHING'S SIGNS: no striae    POCT Blood Glucose.: 107 mg/dL (21 @ 01:54)  POCT Blood Glucose.: 100 mg/dL (21 @ 00:11)    POCT Blood Glucose.: 105 mg/dL (21 @ 19:52)  POCT Blood Glucose.: 98 mg/dL (21 @ 15:58)  POCT Blood Glucose.: 85 mg/dL (21 @ 11:50)                            9.7    7.80  )-----------( 134      ( 05 Sep 2021 06:06 )             29.6           137  |  107  |  7   ----------------------------<  100<H>  4.0   |  16<L>  |  0.54    EGFR if : 151  EGFR if non : 130    Ca    9.1          TPro  5.8<L>  /  Alb  3.0<L>  /  TBili  0.4  /  DBili  x   /  AST  19  /  ALT  16  /  AlkPhos  168<H>

## 2021-09-05 NOTE — PROGRESS NOTE ADULT - SUBJECTIVE AND OBJECTIVE BOX
PA POD # 1 C/S Note    Blood Type:   A+              Rubella:  Immune                 RPR:  NR    Pain:  Controlled  Complaints:  None  Pt. is ambulating, DTV, passing flatus, & tolerating regular diet.  Lochia:  WNL    VS:  T(C): 36.9 (09-05-21 @ 05:00), Max: 37.2 (09-04-21 @ 08:30)  HR: 90 (09-05-21 @ 05:00) (84 - 100)  BP: 109/77 (09-05-21 @ 05:00) (104/74 - 139/90)  RR: 18 (09-05-21 @ 05:00) (18 - 18)  SpO2: 98% (09-05-21 @ 05:00) (97% - 99%)                        13.9   10.34 )-----------( 204      ( 03 Sep 2021 12:20 )             43.0     Abd:  Soft, non-distended, non-tender            Fundus-firm            Incision- C/D/I-SQ  Extremities:  Edema - none                         Calf Tenderness - none    Assessment:    26 y.o. G 1 P 1001     POD # 1 S/P C/S  for Failure to Progress & Descend in stable condition.    PMHx significant for:  GDMA2-controlled with Insulin & Metformin  Current Issues:  None  Plan:  Increase OOB             Cont. Tylenol, Toradol, PCEA, IVF             Check CBC             Cont. Reg. Diet             Cont. PO Care.            Cont. DVT PPx    DAYA Bertrand

## 2021-09-05 NOTE — DISCHARGE NOTE OB - CARE PROVIDER_API CALL
Yamila Pinzon  OBSTETRICS AND GYNECOLOGY  [address], [address]  [place] [address]  Phone: [phone]  Fax: [phone]  Follow Up Time:

## 2021-09-05 NOTE — CHART NOTE - NSCHARTNOTEFT_GEN_A_CORE
Patient seen for: nutrition consult for GDM postpartum education prior to discharge    Source: patient, EMR, spouse     Patient is: G1_ P1001_; GDM [A2]    Chart reviewed, events noted. Meds/Labs reviewed.    Anthropometrics:  Height: 63__ inches  Admit/Dosing wt: 216__ pounds     Nutrition Diagnosis:   Food and Nutrition Related Knowledge Deficit related to limited previous exposure to postpartum GDM nutrition education and recommendations as evidenced by GDM postpartum.    Goal: Pt to state at least two teach back points.    Intervention:  1. Education: Pt educated on postpartum dietary recommendations, including risk of development of T2DM; reinforced importance of DM screening 4-12 weeks postpartum. Reviewed nutrition recommendations for breast feeding, including adequate protein, calorie, and fluid intake.   2. Handout: "What to expect now that you have had your baby"     Monitoring:  No other nutrition risks were identified during this encounter.  RD remains available upon request and will follow up per protocol.  Juana Zimmerman RD, CDN, Pager # [phone]

## 2021-09-05 NOTE — DISCHARGE NOTE OB - MATERIALS PROVIDED
Vaccinations/[facility]  Screening Program/  Immunization Record/Breastfeeding Log/Breastfeeding Mother’s Support Group Information/Guide to Postpartum Care/[facility] Hearing Screen Program/Back To Sleep Handout/Shaken Baby Prevention Handout/Breastfeeding Guide and Packet/Birth Certificate Instructions/Discharge Medication Information for Patients and Families Pocket Guide

## 2021-09-06 LAB
GLUCOSE BLDC GLUCOMTR-MCNC: 102 MG/DL — HIGH (ref 70–99)
GLUCOSE BLDC GLUCOMTR-MCNC: 174 MG/DL — HIGH (ref 70–99)
GLUCOSE BLDC GLUCOMTR-MCNC: 80 MG/DL — SIGNIFICANT CHANGE UP (ref 70–99)
GLUCOSE BLDC GLUCOMTR-MCNC: 92 MG/DL — SIGNIFICANT CHANGE UP (ref 70–99)
GLUCOSE BLDC GLUCOMTR-MCNC: 95 MG/DL — SIGNIFICANT CHANGE UP (ref 70–99)

## 2021-09-06 PROCEDURE — 99232 SBSQ HOSP IP/OBS MODERATE 35: CPT

## 2021-09-06 RX ORDER — METFORMIN HYDROCHLORIDE 850 MG/1
1 TABLET ORAL
Qty: 0 | Refills: 0 | DISCHARGE

## 2021-09-06 RX ORDER — HUMAN INSULIN 100 [IU]/ML
28 INJECTION, SUSPENSION SUBCUTANEOUS
Qty: 0 | Refills: 0 | DISCHARGE

## 2021-09-06 RX ADMIN — Medication 975 MILLIGRAM(S): at 23:50

## 2021-09-06 RX ADMIN — Medication 600 MILLIGRAM(S): at 21:35

## 2021-09-06 RX ADMIN — Medication 600 MILLIGRAM(S): at 13:07

## 2021-09-06 RX ADMIN — Medication 975 MILLIGRAM(S): at 11:43

## 2021-09-06 RX ADMIN — Medication 600 MILLIGRAM(S): at 20:35

## 2021-09-06 RX ADMIN — Medication 975 MILLIGRAM(S): at 17:26

## 2021-09-06 RX ADMIN — Medication 975 MILLIGRAM(S): at 11:13

## 2021-09-06 RX ADMIN — HEPARIN SODIUM 5000 UNIT(S): 5000 INJECTION INTRAVENOUS; SUBCUTANEOUS at 12:37

## 2021-09-06 RX ADMIN — Medication 975 MILLIGRAM(S): at 17:56

## 2021-09-06 RX ADMIN — HEPARIN SODIUM 5000 UNIT(S): 5000 INJECTION INTRAVENOUS; SUBCUTANEOUS at 23:49

## 2021-09-06 RX ADMIN — Medication 100 MILLIGRAM(S): at 04:00

## 2021-09-06 RX ADMIN — Medication 600 MILLIGRAM(S): at 12:37

## 2021-09-06 NOTE — PROGRESS NOTE ADULT - SUBJECTIVE AND OBJECTIVE BOX
Postpartum Note-  Section POD#2      Rubella IgG:    Immune                  RPR:              Negative         Blood Type:    O+    S:Patient is a  26y G 1   P1    POD#2 S/P C/Sec  Subjective: Patient w/o complaints, pain is controlled.  Pt is OOB, tolerating PO, passing flatus, and voiding. Lochia WNL.  The patient denies HA, epigastric pain, blurry vision.   Feeding: Breastfeeding    O:  Vital Signs Last 24 Hrs  T(C): 36.9 (06 Sep 2021 05:00), Max: 36.9 (06 Sep 2021 05:00)  T(F): 98.4 (06 Sep 2021 05:00), Max: 98.4 (06 Sep 2021 05:00)  HR: 83 (06 Sep 2021 05:00) (83 - 91)  BP: 128/85 (06 Sep 2021 05:00) (114/78 - 128/85)  BP(mean): --  RR: 18 (06 Sep 2021 05:00) (18 - 18)  SpO2: 98% (06 Sep 2021 05:00) (98% - 100%)      Gen: NAD  CV: rrr s1s2, CTABL  Abdomen: Soft, nontender, non distended, fundus firm.  Bowel Sounds x 4 quadrants  Incision: Clean, dry, and intact.  Negative erythema/edema/ecchymosis.   SubQ  Lochia WNL  Ext: Neg edema, Neg calf tenderness.  Pedal pulses palpated B/L    LABS:                          9.7    7.80  )-----------( 134      ( 05 Sep 2021 06:06 )             29.6       A/P:  26y  POD # 2 S/P  elective primary  section, doing well    PMHx:  A2 diagnosed at 20 weeks, likely Type 2DM HgbA1C: 6.5% @ 20 weeks  Current Issues:  1.     Increase OOB  PO Pain Protocol  Continue Regular Diet  Continue Routine Postop/Postpartum Care           Postpartum Note-  Section POD#2      Rubella IgG:    Immune                  RPR:              Negative         Blood Type:    O+    S:Patient is a  26y G 1   P1    POD#2 S/P C/Sec  Subjective: Patient w/o complaints, pain is controlled.  Pt is OOB, tolerating PO, passing flatus, and voiding. Lochia WNL.  The patient denies HA, epigastric pain, blurry vision.   Feeding: Breastfeeding    O:  Vital Signs Last 24 Hrs  T(C): 36.9 (06 Sep 2021 05:00), Max: 36.9 (06 Sep 2021 05:00)  T(F): 98.4 (06 Sep 2021 05:00), Max: 98.4 (06 Sep 2021 05:00)  HR: 83 (06 Sep 2021 05:00) (83 - 91)  BP: 128/85 (06 Sep 2021 05:00) (114/78 - 128/85)  BP(mean): --  RR: 18 (06 Sep 2021 05:00) (18 - 18)  SpO2: 98% (06 Sep 2021 05:00) (98% - 100%)      Gen: NAD  CV: rrr s1s2, CTABL  Abdomen: Soft, nontender, non distended, fundus firm.  Bowel Sounds x 4 quadrants  Incision: Clean, dry, and intact.  Negative erythema/edema/ecchymosis.   SubQ  Lochia WNL  Ext: Neg edema, Neg calf tenderness.  Pedal pulses palpated B/L    LABS:                          9.7    7.80  )-----------( 134      ( 05 Sep 2021 06:06 )             29.6       A/P:  26y  POD # 2 S/P  elective primary  section, doing well    PMHx:  A2 diagnosed at 20 weeks, likely Type 2DM HgbA1C: 6.5% @ 20 weeks  Current Issues:  1. A2: Endocrine consult placed with recommendations to continue FS for likely type 2 DM with a follow up HgbA1C at 3 months  2. PEC: BPs WNL, patient is assympomatic, HELLP labs WNL with P/C .3    Increase OOB  PO Pain Protocol  Continue Regular Diet  Continue Routine Postop/Postpartum Care           Postpartum Note-  Section POD#2      Rubella IgG:    Immune                  RPR:              Negative         Blood Type:    O+    S:Patient is a  26y G 1   P1    POD#2 S/P C/Sec  Subjective: Patient w/o complaints, pain is controlled.  Pt is OOB, tolerating PO, passing flatus, and voiding. Lochia WNL.  The patient denies HA, epigastric pain, blurry vision.   Feeding: Breastfeeding    O:  Vital Signs Last 24 Hrs  T(C): 36.9 (06 Sep 2021 05:00), Max: 36.9 (06 Sep 2021 05:00)  T(F): 98.4 (06 Sep 2021 05:00), Max: 98.4 (06 Sep 2021 05:00)  HR: 83 (06 Sep 2021 05:00) (83 - 91)  BP: 128/85 (06 Sep 2021 05:00) (114/78 - 128/85)  BP(mean): --  RR: 18 (06 Sep 2021 05:00) (18 - 18)  SpO2: 98% (06 Sep 2021 05:00) (98% - 100%)      Gen: NAD  CV: rrr s1s2, CTABL  Abdomen: Soft, nontender, non distended, fundus firm.  Bowel Sounds x 4 quadrants  Incision: Clean, dry, and intact.  Negative erythema/edema/ecchymosis.   SubQ  Lochia WNL  Ext: Neg edema, Neg calf tenderness.  Pedal pulses palpated B/L    LABS:                          9.7    7.80  )-----------( 134      ( 05 Sep 2021 06:06 )             29.6       A/P:  26y  POD # 2 S/P  elective primary  section, doing well    PMHx:  A2 diagnosed at 20 weeks, likely Type 2DM HgbA1C: 6.5% @ 20 weeks  Current Issues:  1. A2: Endocrine consult placed with recommendations to continue FS for likely type 2 DM with a follow up HgbA1C at 3 months  2. PEC: BPs WNL, patient is assympomatic, HELLP labs WNL, uric acid 7.4 with P/C .3    Increase OOB  PO Pain Protocol  Continue Regular Diet  Continue Routine Postop/Postpartum Care

## 2021-09-06 NOTE — PROGRESS NOTE ADULT - SUBJECTIVE AND OBJECTIVE BOX
Chief Complaint/Follow-up on: Diabetes Mellitus    Subjective:  yesterday, immediately after eating fruit bowl per patient. She admits that she did not have OGTT done in [place] prior to initiation of Metformin and NPH insulin at that time at 20 weeks. Currently, no nausea or diarrhea    MEDICATIONS  (STANDING):  acetaminophen   Tablet .. 975 milliGRAM(s) Oral <User Schedule>  ceFAZolin   IVPB      ceFAZolin   IVPB 2000 milliGRAM(s) IV Intermittent once  diphtheria/tetanus/pertussis (acellular) Vaccine (ADAcel) 0.5 milliLiter(s) IntraMuscular once  heparin   Injectable 5000 Unit(s) SubCutaneous every 12 hours  ibuprofen  Tablet. 600 milliGRAM(s) Oral every 6 hours  influenza   Vaccine 0.5 milliLiter(s) IntraMuscular once  lactated ringers Bolus 500 milliLiter(s) IV Bolus once  lactated ringers. 1000 milliLiter(s) (125 mL/Hr) IV Continuous <Continuous>  oxytocin Infusion 333.333 milliUNIT(s)/Min (1000 mL/Hr) IV Continuous <Continuous>  oxytocin Infusion 333.333 milliUNIT(s)/Min (1000 mL/Hr) IV Continuous <Continuous>  polyethylene glycol 3350 17 Gram(s) Oral at bedtime  senna 2 Tablet(s) Oral at bedtime    MEDICATIONS  (PRN):  diphenhydrAMINE 25 milliGRAM(s) Oral every 6 hours PRN Pruritus  lanolin Ointment 1 Application(s) Topical every 6 hours PRN Sore Nipples  magnesium hydroxide Suspension 30 milliLiter(s) Oral two times a day PRN Constipation  oxyCODONE    IR 5 milliGRAM(s) Oral every 3 hours PRN Moderate to Severe Pain (4-10)  oxyCODONE    IR 5 milliGRAM(s) Oral once PRN Moderate to Severe Pain (4-10)  simethicone 80 milliGRAM(s) Chew every 4 hours PRN Gas      PHYSICAL EXAM:  VITALS: T(C): 36.9 (09-06-21 @ 05:00)  T(F): 98.4 (09-06-21 @ 05:00), Max: 98.4 (09-06-21 @ 05:00)  HR: 83 (09-06-21 @ 05:00) (83 - 91)  BP: 128/85 (09-06-21 @ 05:00) (115/83 - 128/85)  RR:  (18 - 18)  SpO2:  (98% - 100%)  Wt(kg): 98 kg    GENERAL: NAD, well-groomed, well-developed, overweight  EYES: No proptosis, no injection  HEENT:  Atraumatic, Normocephalic, moist mucous membranes  THYROID: Normal size, no palpable nodules  RESPIRATORY: Clear to auscultation bilaterally; No rales, rhonchi, wheezing, or rubs  CARDIOVASCULAR: Regular rate and rhythm; No murmurs; no peripheral edema  GI: Soft, nontender, + distended, normal bowel sounds  CUSHING'S SIGNS: no striae    POCT Blood Glucose.: 102 mg/dL (09-06-21 @ 10:40)  POCT Blood Glucose.: 92 mg/dL (09-06-21 @ 06:38)  POCT Blood Glucose.: 95 mg/dL (09-06-21 @ 01:03)    POCT Blood Glucose.: 195 mg/dL (09-05-21 @ 21:42)  POCT Blood Glucose.: 99 mg/dL (09-05-21 @ 19:04)    POCT Blood Glucose.: 107 mg/dL (09-04-21 @ 01:54)  POCT Blood Glucose.: 100 mg/dL (09-04-21 @ 00:11)    POCT Blood Glucose.: 105 mg/dL (09-03-21 @ 19:52)  POCT Blood Glucose.: 98 mg/dL (09-03-21 @ 15:58)  POCT Blood Glucose.: 85 mg/dL (09-03-21 @ 11:50)    09-04    137  |  107  |  7   ----------------------------<  100<H>  4.0   |  16<L>  |  0.54    EGFR if : 151  EGFR if non : 130    Ca    9.1      09-04    TPro  5.8<L>  /  Alb  3.0<L>  /  TBili  0.4  /  DBili  x   /  AST  19  /  ALT  16  /  AlkPhos  168<H>  09-04                 no

## 2021-09-06 NOTE — PROVIDER CONTACT NOTE (OTHER) - ASSESSMENT
Patient states that she recently had eaten fruit right before the fingerstick was completed. No other signs or symptoms. No complaints.

## 2021-09-06 NOTE — PROVIDER CONTACT NOTE (OTHER) - BACKGROUND
Patient is a day 1 csection delivery at 0317. 36+3. GDM. Patient was started on insulin at 20 weeks. Monitoring glucose 1 hour preprandial and at bedtime.
Pt is day 2 C/S pt. Pt has fingersticks ordered for GDM.

## 2021-09-06 NOTE — PROVIDER CONTACT NOTE (OTHER) - ACTION/TREATMENT ORDERED:
redo fingerstick again in 4 hours.
Finger stick to be repeated 0ne hour later- pt ate fruit and Pt asked to call before she eats again so we can retake a pre prandial finger stick.

## 2021-09-07 ENCOUNTER — APPOINTMENT (OUTPATIENT)
Dept: ANTEPARTUM | Facility: CLINIC | Age: 26
End: 2021-09-07

## 2021-09-07 VITALS
OXYGEN SATURATION: 99 % | SYSTOLIC BLOOD PRESSURE: 126 MMHG | TEMPERATURE: 98 F | HEART RATE: 62 BPM | DIASTOLIC BLOOD PRESSURE: 87 MMHG | RESPIRATION RATE: 18 BRPM

## 2021-09-07 LAB
GLUCOSE BLDC GLUCOMTR-MCNC: 85 MG/DL — SIGNIFICANT CHANGE UP (ref 70–99)
GLUCOSE BLDC GLUCOMTR-MCNC: 91 MG/DL — SIGNIFICANT CHANGE UP (ref 70–99)

## 2021-09-07 PROCEDURE — 85730 THROMBOPLASTIN TIME PARTIAL: CPT

## 2021-09-07 PROCEDURE — 86850 RBC ANTIBODY SCREEN: CPT

## 2021-09-07 PROCEDURE — 59050 FETAL MONITOR W/REPORT: CPT

## 2021-09-07 PROCEDURE — 82570 ASSAY OF URINE CREATININE: CPT

## 2021-09-07 PROCEDURE — 85610 PROTHROMBIN TIME: CPT

## 2021-09-07 PROCEDURE — 86780 TREPONEMA PALLIDUM: CPT

## 2021-09-07 PROCEDURE — 84550 ASSAY OF BLOOD/URIC ACID: CPT

## 2021-09-07 PROCEDURE — 82962 GLUCOSE BLOOD TEST: CPT

## 2021-09-07 PROCEDURE — 80053 COMPREHEN METABOLIC PANEL: CPT

## 2021-09-07 PROCEDURE — 85384 FIBRINOGEN ACTIVITY: CPT

## 2021-09-07 PROCEDURE — G0463: CPT

## 2021-09-07 PROCEDURE — 59025 FETAL NON-STRESS TEST: CPT

## 2021-09-07 PROCEDURE — 84156 ASSAY OF PROTEIN URINE: CPT

## 2021-09-07 PROCEDURE — 86900 BLOOD TYPING SEROLOGIC ABO: CPT

## 2021-09-07 PROCEDURE — 86901 BLOOD TYPING SEROLOGIC RH(D): CPT

## 2021-09-07 PROCEDURE — 83615 LACTATE (LD) (LDH) ENZYME: CPT

## 2021-09-07 PROCEDURE — 81001 URINALYSIS AUTO W/SCOPE: CPT

## 2021-09-07 PROCEDURE — 85025 COMPLETE CBC W/AUTO DIFF WBC: CPT

## 2021-09-07 PROCEDURE — 86769 SARS-COV-2 COVID-19 ANTIBODY: CPT

## 2021-09-07 PROCEDURE — 87635 SARS-COV-2 COVID-19 AMP PRB: CPT

## 2021-09-07 RX ADMIN — Medication 600 MILLIGRAM(S): at 10:52

## 2021-09-07 RX ADMIN — Medication 975 MILLIGRAM(S): at 11:57

## 2021-09-07 RX ADMIN — Medication 975 MILLIGRAM(S): at 00:50

## 2021-09-07 RX ADMIN — Medication 975 MILLIGRAM(S): at 17:33

## 2021-09-07 RX ADMIN — Medication 600 MILLIGRAM(S): at 03:00

## 2021-09-07 RX ADMIN — Medication 975 MILLIGRAM(S): at 17:04

## 2021-09-07 RX ADMIN — HEPARIN SODIUM 5000 UNIT(S): 5000 INJECTION INTRAVENOUS; SUBCUTANEOUS at 11:57

## 2021-09-07 RX ADMIN — Medication 600 MILLIGRAM(S): at 02:01

## 2021-09-07 RX ADMIN — Medication 600 MILLIGRAM(S): at 10:22

## 2021-09-07 RX ADMIN — Medication 975 MILLIGRAM(S): at 12:27

## 2021-09-07 NOTE — PROGRESS NOTE ADULT - ASSESSMENT
26 y.o. G 1 P 1001     POD # 1 S/P C/S  for Failure to Progress & Descend in stable condition.
26y  POD # 2 S/P  elective primary  section, doing well
A/P:  26y  POD # 3 S/P  repeat/ primary   section, doing well        
25 y/o female with likely T2DM vs. GDM (diagnosed by A1c at 20 weeks in [place]) with no known h/o prediabetes s/p  for baby boy at 36w3d, POD#1 today. Endocrine consulted for management of diabetes mellitus, diagnosis and evaluation    Diabetes Mellitus  - likely T2DM as [facility] was not performed.  - A1c test is unreliable during pregnancy due to limitations of its utility in the pregnant patient  - FS are low 100s at this time, suggested of impaired fasting glucose in a non-pregnant patient  - Check FS only before meals and bedtime inpatient. No indication for post-meal FS at this time.  - Would recommend consistent carb diet inpatient and outpatient  - Would recommend check FS TID AC and HS inpatient. If FS>140 x 7 days, she will need Metformin 500 mg QD or BID at home, to call Ob/Gyn for Rx  - If FS are well controlled without metformin, would recommend OGTT in 6 weeks for accurate diagnosis  - If FS are high and metformin is re-initiated before 6 weeks, would recommend A1c testing in 3 months for accurate diagnosis or hold off Metformin x 48-72 hours and complete OGTT for diagnosis of DM  - Patient was advised to continue eating healthy meals (consistent low/moderate carb meals) and exercise   - F/u OB/GYN in 6 weeks  - For mild hyperglycemia and prediabetes, can be managed with primary care physician  - For complex hyperglycemia management, can be seen with [facility] at [address], [address], [place] [address].   Phone [phone] for appointment with RD/CDE and endocrinologist    Obesity  - Recommend healthy diet (low/mod consistent carb diet) and routine exercise    D/w with , RN, and OB team  Our team will sign off. Please call us with any questions    *Please note a different provider maybe managing this patient tomorrow/daily*. Please contact our office at [phone] regarding follow-up queries about this patient. For any endocrine emergencies, please state urgency when calling [phone] during business hours and to speak with on-call fellow after 5pm and on weekends.    Virgen Sanchez DO  Pager: 9AM - 5PM (Mon-Fri): [phone]  After 5PM and on Weekends: [phone]

## 2021-09-07 NOTE — PROGRESS NOTE ADULT - PROBLEM SELECTOR PLAN 2
endocrine consult appreciated  FSG reviewed - per recommendations, but does not require treatment at this time  continue to monitor FSG AC/HS  plan for outpt followup  Rx for glucometer in patient chart

## 2021-09-07 NOTE — PROGRESS NOTE ADULT - PROBLEM SELECTOR PLAN 1
Cont. PP/PO Care
Increase OOB  PO Pain Protocol  Continue Regular Diet  Continue Routine Postop/Postpartum Care
Increase OOB  Regular diet  PO Pain Protocol  Discharge Planning  Continue routine prenatal care

## 2021-09-07 NOTE — PROGRESS NOTE ADULT - SUBJECTIVE AND OBJECTIVE BOX
Postpartum Note-  Section POD#3    Allergies: No Known Allergies    Subjective: Patient w/o complaints, pain is controlled.  Pt is OOB, tolerating PO, passing flatus. Lochia WNL.     O:  Vital Signs Last 24 Hrs  T(C): 36.6 (07 Sep 2021 04:56), Max: 36.8 (06 Sep 2021 12:37)  T(F): 97.9 (07 Sep 2021 04:56), Max: 98.2 (06 Sep 2021 12:37)  HR: 75 (07 Sep 2021 04:56) (67 - 82)  BP: 131/90 (07 Sep 2021 06:30) (121/85 - 140/82)  BP(mean): 104 (07 Sep 2021 06:30) (104 - 104)  RR: 17 (07 Sep 2021 04:56) (17 - 18)  SpO2: 98% (07 Sep 2021 04:56) (98% - 100%)     Gen: NAD  Heart: S1S2 RRR  Lungs: CTA b/l  Abdomen: Soft, nontender, non-distended, fundus firm.  Incision: Clean, dry, and intact.  Negative erythema/edema/ecchymosis   Sub Q  Lochia WNL  Ext:  Neg Edema, Neg Calf tenderness    LABS:               9.7    7.80  )-----------( 134      ( 09-05 @ 06:06 )             29.6           PAST MEDICAL & SURGICAL HISTORY:  T2DM vs GDM    Current Issues: none

## 2021-09-10 ENCOUNTER — APPOINTMENT (OUTPATIENT)
Dept: ANTEPARTUM | Facility: CLINIC | Age: 26
End: 2021-09-10

## 2021-09-10 ENCOUNTER — APPOINTMENT (OUTPATIENT)
Dept: MATERNAL FETAL MEDICINE | Facility: CLINIC | Age: 26
End: 2021-09-10

## 2022-01-26 NOTE — OB PROVIDER DELIVERY SUMMARY - NSPLACINTACT_OBGYN_ALL_OB
----- Message from Lorin Retana MD sent at 1/25/2022  5:28 PM CST -----  Iron deficient - will cc dr. Rasheed to see if can d/w home nephrologist about IV iron  TSH elevated; encourage patient to review with PCP managing thyroid disease  Await remaining studies to assess hyponatremia  
Pt requested call from Dr. Rasheed or Dr. Garrison.  [employer] message sent to notify of MD recommendations.  ----- Message from Isa Rasheed MD sent at 1/26/2022  9:22 AM CST -----    Pt was seen at tx nephrology clinic on Monday. Below is recommendation. Please call them today - They asked to be called for lab result     - High PTH: calcitriol 0.5 mcg daily  - Anemia of iron and B12 deficiency. B12 1000 mcg daily X 6 months. She needs IV Iron and . IV iron can be arranged with her PCP or local nephrologist  - High TSH: follow with PCP/endo  - the rest of lab is pending   - Regarding kidney function. 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 Optimizing her oral intake, BP would help to delay worsening of kidney function.        
Yes

## 2022-01-28 ENCOUNTER — APPOINTMENT (OUTPATIENT)
Dept: ULTRASOUND IMAGING | Facility: CLINIC | Age: 27
End: 2022-01-28
Payer: COMMERCIAL

## 2022-01-28 PROCEDURE — 76830 TRANSVAGINAL US NON-OB: CPT

## 2022-01-28 PROCEDURE — 76856 US EXAM PELVIC COMPLETE: CPT

## 2024-04-30 NOTE — OB RN PATIENT PROFILE - ATTEMPT TO OOB
"Anesthesia Transfer of Care Note    Patient: Dee Hamilton    Procedure(s) Performed: Procedure(s) (LRB):  COLONOSCOPY (N/A)  EGD (ESOPHAGOGASTRODUODENOSCOPY) (N/A)    Patient location: PACU    Anesthesia Type: general    Transport from OR: Transported from OR on room air with adequate spontaneous ventilation    Post pain: adequate analgesia    Post assessment: no apparent anesthetic complications    Post vital signs: stable    Level of consciousness: awake    Nausea/Vomiting: no nausea/vomiting    Complications: none    Transfer of care protocol was followed    Last vitals: Visit Vitals  BP (!) 153/73 (BP Location: Left arm, Patient Position: Lying)   Pulse 91   Temp 36.4 °C (97.5 °F) (Temporal)   Resp 16   Ht 5' 7" (1.702 m)   Wt 81.6 kg (180 lb)   SpO2 97%   Breastfeeding No   BMI 28.19 kg/m²     "
no